# Patient Record
Sex: FEMALE | Race: OTHER | HISPANIC OR LATINO | URBAN - METROPOLITAN AREA
[De-identification: names, ages, dates, MRNs, and addresses within clinical notes are randomized per-mention and may not be internally consistent; named-entity substitution may affect disease eponyms.]

---

## 2022-07-09 ENCOUNTER — TELEPHONE ENCOUNTER (OUTPATIENT)
Dept: URBAN - METROPOLITAN AREA CLINIC 121 | Facility: CLINIC | Age: 67
End: 2022-07-09

## 2022-07-10 ENCOUNTER — TELEPHONE ENCOUNTER (OUTPATIENT)
Dept: URBAN - METROPOLITAN AREA CLINIC 121 | Facility: CLINIC | Age: 67
End: 2022-07-10

## 2022-07-10 RX ORDER — ALBUTEROL SULFATE 90 UG/1
INHALANT RESPIRATORY (INHALATION)
Refills: 6 | Status: ACTIVE | COMMUNITY
Start: 2019-07-15

## 2022-07-10 RX ORDER — LEVOTHYROXINE SODIUM 100 UG/1
TABLET ORAL
Refills: 3 | Status: ACTIVE | COMMUNITY
Start: 2019-07-15

## 2022-07-10 RX ORDER — FLUTICASONE FUROATE AND VILANTEROL 200; 25 UG/1; UG/1
POWDER RESPIRATORY (INHALATION)
Refills: 4 | Status: ACTIVE | COMMUNITY
Start: 2019-07-15

## 2022-07-10 RX ORDER — OMEPRAZOLE 40 MG/1
CAPSULE, DELAYED RELEASE ORAL
Refills: 1 | Status: ACTIVE | COMMUNITY
Start: 2019-07-15

## 2022-07-10 RX ORDER — FLUNISOLIDE 0.25 MG/ML
SOLUTION NASAL
Refills: 3 | Status: ACTIVE | COMMUNITY
Start: 2019-07-15

## 2022-07-10 RX ORDER — ALPRAZOLAM 0.5 MG/1
TABLET ORAL
Refills: 2 | Status: ACTIVE | COMMUNITY
Start: 2019-07-15

## 2022-07-10 RX ORDER — ROSUVASTATIN CALCIUM 10 MG/1
TABLET, FILM COATED ORAL
Refills: 2 | Status: ACTIVE | COMMUNITY
Start: 2019-07-15

## 2022-07-10 RX ORDER — IPRATROPIUM BROMIDE AND ALBUTEROL SULFATE 2.5; .5 MG/3ML; MG/3ML
SOLUTION RESPIRATORY (INHALATION)
Refills: 6 | Status: ACTIVE | COMMUNITY
Start: 2019-07-15

## 2023-08-22 ENCOUNTER — P2P PATIENT RECORD (OUTPATIENT)
Age: 68
End: 2023-08-22

## 2023-11-22 ENCOUNTER — LAB OUTSIDE AN ENCOUNTER (OUTPATIENT)
Dept: URBAN - METROPOLITAN AREA CLINIC 63 | Facility: CLINIC | Age: 68
End: 2023-11-22

## 2023-11-22 ENCOUNTER — OFFICE VISIT (OUTPATIENT)
Dept: URBAN - METROPOLITAN AREA CLINIC 63 | Facility: CLINIC | Age: 68
End: 2023-11-22
Payer: MEDICARE

## 2023-11-22 VITALS
HEART RATE: 84 BPM | WEIGHT: 174 LBS | OXYGEN SATURATION: 98 % | HEIGHT: 63 IN | BODY MASS INDEX: 30.83 KG/M2 | SYSTOLIC BLOOD PRESSURE: 126 MMHG | TEMPERATURE: 97.2 F | DIASTOLIC BLOOD PRESSURE: 80 MMHG

## 2023-11-22 DIAGNOSIS — R10.32 LLQ PAIN: ICD-10-CM

## 2023-11-22 DIAGNOSIS — K57.92 DIVERTICULITIS: ICD-10-CM

## 2023-11-22 DIAGNOSIS — K57.32 DIVERTICULITIS OF LARGE INTESTINE, UNSPECIFIED BLEEDING STATUS, UNSPECIFIED COMPLICATION STATUS: ICD-10-CM

## 2023-11-22 DIAGNOSIS — E66.3 OVERWEIGHT: ICD-10-CM

## 2023-11-22 PROCEDURE — 99204 OFFICE O/P NEW MOD 45 MIN: CPT | Performed by: INTERNAL MEDICINE

## 2023-11-22 RX ORDER — FLUTICASONE FUROATE AND VILANTEROL TRIFENATATE 200; 25 UG/1; UG/1
POWDER RESPIRATORY (INHALATION)
Qty: 60 BLISTER | Status: ACTIVE | COMMUNITY

## 2023-11-22 RX ORDER — ROSUVASTATIN CALCIUM 10 MG/1
TABLET, FILM COATED ORAL
Refills: 2 | Status: ACTIVE | COMMUNITY
Start: 2019-07-15

## 2023-11-22 RX ORDER — AMOXICILLIN AND CLAVULANATE POTASSIUM 875; 125 MG/1; MG/1
1 TABLET TABLET, FILM COATED ORAL
Qty: 28 TABLET | OUTPATIENT
Start: 2023-11-22 | End: 2023-12-06

## 2023-11-22 RX ORDER — MONTELUKAST 10 MG/1
1 TABLET TABLET, FILM COATED ORAL ONCE A DAY
Status: ACTIVE | COMMUNITY

## 2023-11-22 RX ORDER — LEVOTHYROXINE SODIUM 50 UG/1
1 TABLET IN THE MORNING ON AN EMPTY STOMACH TABLET ORAL ONCE A DAY
Refills: 3 | Status: ACTIVE | COMMUNITY
Start: 2019-07-15

## 2023-11-22 RX ORDER — OMEPRAZOLE 40 MG/1
CAPSULE, DELAYED RELEASE ORAL
Refills: 1 | Status: ACTIVE | COMMUNITY
Start: 2019-07-15

## 2023-11-22 NOTE — PHYSICAL EXAM GASTROINTESTINAL
Abdomen , soft, tender to palpation od LLQ, no guarding or rigidity , no masses palpable , normal bowel sounds , Liver and Spleen,  no hepatosplenomegaly , liver nontender

## 2023-11-22 NOTE — HPI-TODAY'S VISIT:
Patient comes for evaluation, does have personal h/o diverticulitis. her last colonoscopy was done in 2017 and as per report only hemorrhoids and sigmoid diverticulosis.  Patient feels some discomfort but no pain like when she had the diverticulitis. will do diet and water in diet, will follow as needed basis. (will do trial with probiotics). 11/23: Patient  with personal h/o diverticulitis, started 2 days ago, also with acid reflux and heartbirning. Will start PPi and continue with antibiotics.  Will do CT scan of the abdomen and pelvis, will follow closely.

## 2023-11-28 ENCOUNTER — TELEPHONE ENCOUNTER (OUTPATIENT)
Dept: URBAN - METROPOLITAN AREA CLINIC 63 | Facility: CLINIC | Age: 68
End: 2023-11-28

## 2024-02-28 ENCOUNTER — OV EP (OUTPATIENT)
Dept: URBAN - METROPOLITAN AREA CLINIC 63 | Facility: CLINIC | Age: 69
End: 2024-02-28
Payer: MEDICARE

## 2024-02-28 VITALS
OXYGEN SATURATION: 98 % | HEART RATE: 79 BPM | SYSTOLIC BLOOD PRESSURE: 120 MMHG | WEIGHT: 180 LBS | DIASTOLIC BLOOD PRESSURE: 60 MMHG | TEMPERATURE: 97 F | BODY MASS INDEX: 31.89 KG/M2 | HEIGHT: 63 IN

## 2024-02-28 DIAGNOSIS — E66.3 OVERWEIGHT: ICD-10-CM

## 2024-02-28 DIAGNOSIS — Z86.010 PERSONAL HISTORY OF COLONIC POLYPS: ICD-10-CM

## 2024-02-28 DIAGNOSIS — K57.90 DIVERTICULOSIS: ICD-10-CM

## 2024-02-28 PROCEDURE — 99214 OFFICE O/P EST MOD 30 MIN: CPT | Performed by: INTERNAL MEDICINE

## 2024-02-28 RX ORDER — FLUTICASONE FUROATE AND VILANTEROL TRIFENATATE 200; 25 UG/1; UG/1
POWDER RESPIRATORY (INHALATION)
Qty: 60 BLISTER | Status: ACTIVE | COMMUNITY

## 2024-02-28 RX ORDER — ROSUVASTATIN CALCIUM 10 MG/1
TABLET, FILM COATED ORAL
Refills: 2 | Status: ACTIVE | COMMUNITY
Start: 2019-07-15

## 2024-02-28 RX ORDER — OMEPRAZOLE 40 MG/1
CAPSULE, DELAYED RELEASE ORAL
Refills: 1 | Status: ACTIVE | COMMUNITY
Start: 2019-07-15

## 2024-02-28 RX ORDER — LEVOTHYROXINE SODIUM 50 UG/1
1 TABLET IN THE MORNING ON AN EMPTY STOMACH TABLET ORAL ONCE A DAY
Refills: 3 | Status: ACTIVE | COMMUNITY
Start: 2019-07-15

## 2024-02-28 RX ORDER — MONTELUKAST 10 MG/1
1 TABLET TABLET, FILM COATED ORAL ONCE A DAY
Status: ACTIVE | COMMUNITY

## 2024-02-28 NOTE — HPI-TODAY'S VISIT:
Patient comes for evaluation, does have personal h/o diverticulitis. her last colonoscopy was done in 2017 and as per report only hemorrhoids and sigmoid diverticulosis.  Patient feels some discomfort but no pain like when she had the diverticulitis. will do diet and water in diet, will follow as needed basis. (will do trial with probiotics). 11/23: Patient  with personal h/o diverticulitis, started 2 days ago, also with acid reflux and heartbirning. Will start PPi and continue with antibiotics.  Will do CT scan of the abdomen and pelvis, will follow closely. 2/24: Patient had a CT scan on December 2023 with impression of no acute abdominal pelvic abnormality, hepatic and left renal cyst, colonic diverticulosis without CT evidence of diverticulitis, bibasilar areas of atelectasis or scar seen.  Patientdoing well, with occasional GERD, will do diet and will coninue with low dose of PPI. patient with personal h/o colon olyps, denies family h/o colon cancer. Will plan surveillance colonoscopy.

## 2024-03-06 ENCOUNTER — LAB (OUTPATIENT)
Dept: URBAN - METROPOLITAN AREA CLINIC 63 | Facility: CLINIC | Age: 69
End: 2024-03-06

## 2024-03-25 ENCOUNTER — COLON (OUTPATIENT)
Dept: URBAN - METROPOLITAN AREA SURGERY CENTER 4 | Facility: SURGERY CENTER | Age: 69
End: 2024-03-25
Payer: MEDICARE

## 2024-03-25 DIAGNOSIS — Z12.11 ENCOUNTER FOR SCREENING FOR MALIGNANT NEOPLASM OF COLON: ICD-10-CM

## 2024-03-25 DIAGNOSIS — K63.5 POLYP OF TRANSVERSE COLON, UNSPECIFIED TYPE: ICD-10-CM

## 2024-03-25 DIAGNOSIS — K57.30 DIVERTICULOSIS OF LARGE INTESTINE WITHOUT PERFORATION OR ABSCESS WITHOUT BLEEDING: ICD-10-CM

## 2024-03-25 DIAGNOSIS — K64.1 SECOND DEGREE HEMORRHOIDS: ICD-10-CM

## 2024-03-25 PROCEDURE — 45380 COLONOSCOPY AND BIOPSY: CPT | Performed by: INTERNAL MEDICINE

## 2024-03-25 PROCEDURE — 45385 COLONOSCOPY W/LESION REMOVAL: CPT | Performed by: INTERNAL MEDICINE

## 2024-03-25 RX ORDER — ROSUVASTATIN CALCIUM 10 MG/1
TABLET, FILM COATED ORAL
Refills: 2 | Status: ACTIVE | COMMUNITY
Start: 2019-07-15

## 2024-03-25 RX ORDER — FLUTICASONE FUROATE AND VILANTEROL TRIFENATATE 200; 25 UG/1; UG/1
POWDER RESPIRATORY (INHALATION)
Qty: 60 BLISTER | Status: ACTIVE | COMMUNITY

## 2024-03-25 RX ORDER — OMEPRAZOLE 40 MG/1
CAPSULE, DELAYED RELEASE ORAL
Refills: 1 | Status: ACTIVE | COMMUNITY
Start: 2019-07-15

## 2024-03-25 RX ORDER — LEVOTHYROXINE SODIUM 50 UG/1
1 TABLET IN THE MORNING ON AN EMPTY STOMACH TABLET ORAL ONCE A DAY
Refills: 3 | Status: ACTIVE | COMMUNITY
Start: 2019-07-15

## 2024-03-25 RX ORDER — MONTELUKAST 10 MG/1
1 TABLET TABLET, FILM COATED ORAL ONCE A DAY
Status: ACTIVE | COMMUNITY

## 2024-04-10 ENCOUNTER — TELEP (OUTPATIENT)
Dept: URBAN - METROPOLITAN AREA TELEHEALTH 1 | Facility: TELEHEALTH | Age: 69
End: 2024-04-10
Payer: MEDICARE

## 2024-04-10 DIAGNOSIS — E66.3 OVERWEIGHT: ICD-10-CM

## 2024-04-10 DIAGNOSIS — K21.9 CHRONIC GERD: ICD-10-CM

## 2024-04-10 DIAGNOSIS — D36.9 TUBULAR ADENOMA: ICD-10-CM

## 2024-04-10 PROCEDURE — 99443 PHONE E/M BY PHYS 21-30 MIN: CPT | Performed by: INTERNAL MEDICINE

## 2024-04-10 RX ORDER — OMEPRAZOLE 40 MG/1
CAPSULE, DELAYED RELEASE ORAL
Refills: 1 | Status: ACTIVE | COMMUNITY
Start: 2019-07-15

## 2024-04-10 RX ORDER — LEVOTHYROXINE SODIUM 50 UG/1
1 TABLET IN THE MORNING ON AN EMPTY STOMACH TABLET ORAL ONCE A DAY
Refills: 3 | Status: ACTIVE | COMMUNITY
Start: 2019-07-15

## 2024-04-10 RX ORDER — OMEPRAZOLE 40 MG/1
1 CAPSULE 30 MINUTES BEFORE MORNING MEAL AND DINNER CAPSULE, DELAYED RELEASE ORAL TWICE A DAY
Qty: 60 | Refills: 1 | OUTPATIENT
Start: 2019-07-15

## 2024-04-10 RX ORDER — MONTELUKAST 10 MG/1
1 TABLET TABLET, FILM COATED ORAL ONCE A DAY
Status: ACTIVE | COMMUNITY

## 2024-04-10 RX ORDER — ROSUVASTATIN CALCIUM 10 MG/1
TABLET, FILM COATED ORAL
Refills: 2 | Status: ACTIVE | COMMUNITY
Start: 2019-07-15

## 2024-04-10 RX ORDER — FLUTICASONE FUROATE AND VILANTEROL TRIFENATATE 200; 25 UG/1; UG/1
POWDER RESPIRATORY (INHALATION)
Qty: 60 BLISTER | Status: ACTIVE | COMMUNITY

## 2024-04-10 NOTE — HPI-TODAY'S VISIT:
Patient comes for evaluation, does have personal h/o diverticulitis. her last colonoscopy was done in 2017 and as per report only hemorrhoids and sigmoid diverticulosis.  Patient feels some discomfort but no pain like when she had the diverticulitis. will do diet and water in diet, will follow as needed basis. (will do trial with probiotics). 11/23: Patient  with personal h/o diverticulitis, started 2 days ago, also with acid reflux and heartbirning. Will start PPi and continue with antibiotics.  Will do CT scan of the abdomen and pelvis, will follow closely. 2/24: Patient had a CT scan on December 2023 with impression of no acute abdominal pelvic abnormality, hepatic and left renal cyst, colonic diverticulosis without CT evidence of diverticulitis, bibasilar areas of atelectasis or scar seen.  Patientdoing well, with occasional GERD, will do diet and will coninue with low dose of PPI. patient with personal h/o colon olyps, denies family h/o colon cancer. Will plan surveillance colonoscopy. 4/24: Patient is being seen after colonoscopy that was performed 25 March of 2024 with the impression of a 5 mm polyp in the hepatic flexure and 2 polyps in the transverse colon these were from 5 to 10 mm removed with cold snare, mild diverticulosis and internal hemorrhoids.  Pathology report with evidence of all the polyps being tubular adenomas.  Was recommended to repeat a colonoscopy in 3 years.  Patient is doing well after colonoscopy, denies any rectal bleeding or abdominal pain.  Will follow as needed basis, will do recall in 3 years.

## 2025-02-28 ENCOUNTER — OFFICE VISIT (OUTPATIENT)
Dept: URBAN - METROPOLITAN AREA CLINIC 60 | Facility: CLINIC | Age: 70
End: 2025-02-28

## 2025-06-25 ENCOUNTER — OFFICE VISIT (OUTPATIENT)
Dept: URBAN - METROPOLITAN AREA CLINIC 63 | Facility: CLINIC | Age: 70
End: 2025-06-25
Payer: MEDICARE

## 2025-06-25 ENCOUNTER — DASHBOARD ENCOUNTERS (OUTPATIENT)
Age: 70
End: 2025-06-25

## 2025-06-25 ENCOUNTER — LAB OUTSIDE AN ENCOUNTER (OUTPATIENT)
Dept: URBAN - METROPOLITAN AREA CLINIC 63 | Facility: CLINIC | Age: 70
End: 2025-06-25

## 2025-06-25 DIAGNOSIS — K57.90 DIVERTICULOSIS: ICD-10-CM

## 2025-06-25 DIAGNOSIS — K21.9 CHRONIC GERD: ICD-10-CM

## 2025-06-25 PROCEDURE — 99213 OFFICE O/P EST LOW 20 MIN: CPT

## 2025-06-25 RX ORDER — ALBUTEROL SULFATE 2.5 MG/3ML
USE 1 VIAL VIA NEBULIZER EVERY 4 TO 6 HOURS FOR 5 DAYS AS NEEDED SOLUTION RESPIRATORY (INHALATION)
Qty: 75 MILLILITER | Refills: 0 | Status: ACTIVE | COMMUNITY

## 2025-06-25 RX ORDER — LEVOTHYROXINE SODIUM 0.07 MG/1
TAKE 1 TABLET BY MOUTH EVERY DAY IN THE MORNING TABLET ORAL
Qty: 90 EACH | Refills: 2 | Status: ACTIVE | COMMUNITY

## 2025-06-25 RX ORDER — CROMOLYN SODIUM 40 MG/ML
SOLUTION/ DROPS OPHTHALMIC
Qty: 10 MILLILITER | Status: ACTIVE | COMMUNITY

## 2025-06-25 RX ORDER — ALPRAZOLAM 0.5 MG/1
TABLET ORAL
Qty: 30 TABLET | Status: ACTIVE | COMMUNITY

## 2025-06-25 RX ORDER — FAMOTIDINE 40 MG/1
TABLET, FILM COATED ORAL
Qty: 90 TABLET | Status: ACTIVE | COMMUNITY

## 2025-06-25 RX ORDER — FLUTICASONE PROPIONATE 50 UG/1
SHAKE LIQUID AND USE 1 SPRAY IN EACH NOSTRIL EVERY DAY AS DIRECTED SPRAY, METERED NASAL
Qty: 16 GRAM | Refills: 0 | Status: ACTIVE | COMMUNITY

## 2025-06-25 RX ORDER — OMEPRAZOLE 40 MG/1
CAPSULE, DELAYED RELEASE ORAL
Qty: 90 CAPSULE | Status: ON HOLD | COMMUNITY

## 2025-06-25 NOTE — HPI-TODAY'S VISIT:
Patient comes for evaluation, does have personal h/o diverticulitis. Her last colonoscopy was done in 2017 and as per report only hemorrhoids and sigmoid diverticulosis.  Patient feels some discomfort but no pain like when she had the diverticulitis. Will do diet and water in diet, will follow as needed basis. (will do trial with probiotics). 11/23: Patient  with personal h/o diverticulitis, started 2 days ago, also with acid reflux and heartbirning. Will start PPI and continue with antibiotics.  Will do CT scan of the abdomen and pelvis, will follow closely. 2/24: Patient had a CT scan on December 2023 with impression of no acute abdominal pelvic abnormality, hepatic and left renal cyst, colonic diverticulosis without CT evidence of diverticulitis, bibasilar areas of atelectasis or scar seen.  Patient doing well, with occasional GERD, will do diet and will continue with low dose of PPI. Patient with personal h/o colon polyps, denies family h/o colon cancer. Will plan surveillance colonoscopy. 4/24: Patient is being seen after colonoscopy that was performed 25 March 2024 with the impression of a 5 mm polyp in the hepatic flexure and 2 polyps in the transverse colon these were from 5 to 10 mm removed with cold snare, mild diverticulosis and internal hemorrhoids.  Pathology report with evidence of all the polyps being tubular adenomas.  Was recommended to repeat a colonoscopy in 3 years.  Patient is doing well after colonoscopy, denies any rectal bleeding or abdominal pain.  Will follow as needed basis, will do recall in 3 years.  6/25 This is a 70 years old female patient with past medical history of chronic GERD, diverticulosis, history of polyp of the colon seen in the office visit today in good general state patient reported for the last year she has been taking PPI medication omeprazole in the morning time, famotidine at bedtime, for the last 3 months has been presenting with distressing acid reflux symptoms, stomach discomfort, feeling bleaching and full of gasses, patient has never had EGD before, No known cardiac or lung issues. No issues with anesthesia previously. Will pant to continue with current treatment, will do EGD.

## 2025-08-28 ENCOUNTER — OFFICE VISIT (OUTPATIENT)
Dept: URBAN - METROPOLITAN AREA SURGERY CENTER 4 | Facility: SURGERY CENTER | Age: 70
End: 2025-08-28
Payer: MEDICARE

## 2025-08-28 DIAGNOSIS — K21.9 GASTRO-ESOPHAGEAL REFLUX DISEASE WITHOUT ESOPHAGITIS: ICD-10-CM

## 2025-08-28 DIAGNOSIS — K29.60 OTHER GASTRITIS WITHOUT BLEEDING: ICD-10-CM

## 2025-08-28 DIAGNOSIS — K44.9 DIAPHRAGMATIC HERNIA WITHOUT OBSTRUCTION OR GANGRENE: ICD-10-CM

## 2025-08-28 PROCEDURE — 43239 EGD BIOPSY SINGLE/MULTIPLE: CPT | Performed by: INTERNAL MEDICINE

## 2025-08-28 RX ORDER — ALBUTEROL SULFATE 2.5 MG/3ML
USE 1 VIAL VIA NEBULIZER EVERY 4 TO 6 HOURS FOR 5 DAYS AS NEEDED SOLUTION RESPIRATORY (INHALATION)
Qty: 75 MILLILITER | Refills: 0 | Status: ACTIVE | COMMUNITY

## 2025-08-28 RX ORDER — ALPRAZOLAM 0.5 MG/1
TABLET ORAL
Qty: 30 TABLET | Status: ACTIVE | COMMUNITY

## 2025-08-28 RX ORDER — FAMOTIDINE 40 MG/1
TABLET, FILM COATED ORAL
Qty: 90 TABLET | Status: ACTIVE | COMMUNITY

## 2025-08-28 RX ORDER — OMEPRAZOLE 40 MG/1
CAPSULE, DELAYED RELEASE ORAL
Qty: 90 CAPSULE | Status: ON HOLD | COMMUNITY

## 2025-08-28 RX ORDER — LEVOTHYROXINE SODIUM 0.07 MG/1
TAKE 1 TABLET BY MOUTH EVERY DAY IN THE MORNING TABLET ORAL
Qty: 90 EACH | Refills: 2 | Status: ACTIVE | COMMUNITY

## 2025-08-28 RX ORDER — FLUTICASONE PROPIONATE 50 UG/1
SHAKE LIQUID AND USE 1 SPRAY IN EACH NOSTRIL EVERY DAY AS DIRECTED SPRAY, METERED NASAL
Qty: 16 GRAM | Refills: 0 | Status: ACTIVE | COMMUNITY

## 2025-08-28 RX ORDER — CROMOLYN SODIUM 40 MG/ML
SOLUTION/ DROPS OPHTHALMIC
Qty: 10 MILLILITER | Status: ACTIVE | COMMUNITY